# Patient Record
Sex: FEMALE | Race: WHITE | URBAN - METROPOLITAN AREA
[De-identification: names, ages, dates, MRNs, and addresses within clinical notes are randomized per-mention and may not be internally consistent; named-entity substitution may affect disease eponyms.]

---
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
## 2020-08-03 NOTE — ED ADULT NURSE NOTE - SEVERITY
History and Physical   Colon and Rectal Surgery   Ana Gross 66 y o  female MRN: [id number]  Unit/Bed#:  Encounter: [id number]  08/03/20   8:41 AM      CC:  History of perforated diverticulitis  History of Present Illness   HPI:  Ana Gross is a 66 y o  female with no GI symptoms      Historical Information   Past Medical History:   Diagnosis Date    Hyperlipidemia     Migraine      Past Surgical History:   Procedure Laterality Date    BREAST EXCISIONAL BIOPSY Right 1986    HARTMANS PROCEDURE N/A 3/8/2020    Procedure: Laparoscopic drainage of intra peritoneal abscess, sigmoid rescetion,;  Surgeon: Jay Castellanos MD;  Location: [facility];  Service: Colorectal    HYSTERECTOMY  1978    age 26   [facility] NASAL SEPTUM SURGERY      deviation repair    MT LAP,DIAGNOSTIC ABDOMEN N/A 3/8/2020    Procedure: LAPAROSCOPY DIAGNOSTIC;  Surgeon: Jay Castellanos MD;  [facility];  Service: Colorectal    MT SIGMOIDOSCOPY FLX DX W/COLLJ SPEC BR/WA IF PFRMD N/A 3/8/2020    Procedure: Jus Powellle;  Surgeon: Jay Castellanos MD;  [facility]: [facility];  Service: Colorectal    SHOULDER SURGERY         Meds/Allergies     (Not in a hospital admission)        Current Outpatient Medications:     aspirin (ECOTRIN) 325 mg EC tablet, Take 1 tablet (325 mg total) by mouth daily, Disp: 30 tablet, Rfl: 0    Cholecalciferol (VITAMIN D3 PO), Take by mouth, Disp: , Rfl:     citalopram (CeleXA) 20 mg tablet, Take 1 tablet (20 mg total) by mouth daily, Disp: 90 tablet, Rfl: 0    cyanocobalamin ([employer] VITAMIN B-12) 1000 MCG tablet, Take 1,000 mcg by mouth, Disp: , Rfl:     divalproex sodium (DEPAKOTE ER) 250 mg 24 hr tablet, Take 1 tablet (250 mg total) by mouth 2 (two) times a day, Disp: 180 tablet, Rfl: 5    Magnesium 500 MG CAPS, Take by mouth, Disp: , Rfl:     OLANZapine (ZyPREXA) 5 mg tablet, At bedtime only as needed, Disp: 10 tablet, Rfl: 3    pyridoxine (B-6) 100 MG tablet, Take 100 mg by mouth, Disp: , Rfl:     Riboflavin (B2) 100 MG TABS, Take by mouth, Disp: , Rfl:     simvastatin (ZOCOR) 40 mg tablet, Take 1 tablet (40 mg total) by mouth daily, Disp: 90 tablet, Rfl: 3    zolpidem (AMBIEN) 5 mg tablet, One pill at bedtime as needed to help with sleep, Disp: 30 tablet, Rfl: 3    albuterol (VENTOLIN HFA) 90 mcg/act inhaler, Inhale 2 puffs every 6 (six) hours as needed for wheezing, Disp: 1 Inhaler, Rfl: 0    naproxen (NAPROSYN) 500 mg tablet, Take 1 tablet (500 mg total) by mouth as needed for headaches, Disp: 10 tablet, Rfl: 0    SUMAtriptan (IMITREX) 100 mg tablet, Take 1 tablet at onset of migraine headache   May repeat in 2 hours if needed, no more than 2 in 24 hours and no more than 3 in a week, Disp: 12 tablet, Rfl: 0    Allergies   Allergen Reactions    Penicillins Anaphylaxis    Azithromycin Hives    Nisoldipine      Reaction Date: 14Apr2011;     Sulfa Antibiotics Hives         Social History   Social History     Substance and Sexual Activity   Alcohol Use Yes    Comment: social      Social History     Substance and Sexual Activity   Drug Use No     Social History     Tobacco Use   Smoking Status Current Every Day Smoker    Packs/day: 0 50    Years: 53 00    Pack years: 26 50    Start date: 1965   Smokeless Tobacco Never Used         Family History:   Family History   Problem Relation Age of Onset    Breast cancer Mother 48    Heart disease Father     Diabetes Sister     No Known Problems Maternal Grandmother     No Known Problems Maternal Grandfather     No Known Problems Paternal Grandmother     No Known Problems Paternal Grandfather     No Known Problems Sister     Breast cancer Maternal Aunt 48    No Known Problems Maternal Aunt     No Known Problems Paternal Aunt     Colon cancer Maternal Uncle     No Known Problems Son     No Known Problems Son     Lung cancer Other 52         Objective     Current Vitals:   Blood Pressure: 139/70 (08/03/20 0814)  Pulse: 74 (08/03/20 3394)  Temperature: 98 5 °F (36 9 °C) (08/03/20 0814)  Temp Source: Tympanic (08/03/20 0814)  Respirations: 18 (08/03/20 0814)  Height: 5' 6" (08/03/20 5883)  Weight - Scale: 67 1 kg (148 lb) (08/03/20 0814)  SpO2: 97 % (08/03/20 0814)  No intake or output data in the 24 hours ending 08/03/20 0841    Physical Exam:  General:  Well nourished, no distress  Neuro: Alert and oriented  Eyes:Sclera anicteric, conjunctiva pink  Pulm: Clear to auscultation bilaterally  No respiratory Distress  CV:  Regular rate and rhythm  No murmurs  Abdomen:  Soft, flat, non-tender, without masses or hepatosplenomegaly  Lab Results:       ASSESSMENT:  Jignesh Gamez is a 66 y o  female for screening  PLAN:  Colonoscopy  Risks , including, but not limited to, bleeding, perforation, missed lesions, and potential need for surgery, were reviewed  Alternatives to colonoscopy were discussed    Maddi Lai MD [facility]

## 2021-02-03 NOTE — ED PROVIDER NOTE - ABDOMINAL TENDER
63 right lower quadrant/periumbilical/left lower quadrant periumbilical/right lower quadrant/left lower quadrant/no rebound or guarding

## 2022-11-10 ENCOUNTER — NON-APPOINTMENT (OUTPATIENT)
Age: 22
End: 2022-11-10

## 2022-11-13 ENCOUNTER — NON-APPOINTMENT (OUTPATIENT)
Age: 22
End: 2022-11-13

## 2023-08-16 ENCOUNTER — NON-APPOINTMENT (OUTPATIENT)
Age: 23
End: 2023-08-16

## 2024-02-20 ENCOUNTER — NON-APPOINTMENT (OUTPATIENT)
Age: 24
End: 2024-02-20

## 2024-02-27 NOTE — ED PROVIDER NOTE - CONSTITUTIONAL NEGATIVE STATEMENT, MLM
[facility] Hospitalist Progress Note   Attending: Dr Darryl Childs  Primary:Tessa Gil MD   DOA: 02/26/2024    Date of service: 02/27/2024    [facility] [place] [facility] Hospitalist:  HPI:   Per H&P \"Fina Barry is a 38 year old female with PMH notable for Grave's disease and primary hyperparathyroidism s/p thyroidectomy and multigland parathyroidectomy, anxiety, depression, bipolar personality disorder who presents feeling generally unwell for about a week. She has had multiple symptoms that come and go. Initially had abdominal cramping and nausea that was relieved by eating. She is now having nausea that is worsened with eating and associated with some episodes of vomiting. Poor PO intake as a result. Most recently noting lightheadedness, feeling like she could pass out. She reported non-compliance with medications. Was not taking calcium initially and thought maybe her symptoms were due to hypocalcemic so then took more than prescribed to make up for missed doses. She reports taking Lithium as prescribed. Denies fevers, chills, dysuria, pelvic pain\".   ED course:   - Initially hypertensive, now within normal limits  - Cr 1.98, calcium elevated    - Lithium level elevated   - UA very mildly abnormal   - Started on IVF and admitted for further management     CC:   Nausea and vomiting with po intake, feeling that she may \"pass out\", poor po intake for about a week. Reports poor medication compliance.     Subjective:     Patient complains of \"severe HA\" that is identical to her typical migraine plus nausea, feeling that she \"might pass out\", fatigue, weakness and muscle aching.   She reports that these symptoms started around the same time that she stopped her Invega and took one dose of Varylar. She was unsure if she is going through withdrawal from the Invega or she wondered if she had a reaction to the one dose of the Varylar.       She is currently laying in bed with an ice pack over her eyes   Her behavior is not manic and she  is very hypoactive, she needs encouragement do participate in any activity currently     Past Medical History:   Diagnosis Date    ADHD      Anxiety      Atopic dermatitis 11/13/2013    Borderline personality disorder (CMD)      Depression      Dysmetabolic syndrome      Excessive thirst      RANDOLPH (generalized anxiety disorder)      Hx of migraine headaches      Hypothyroidism      Inflammatory polyarthropathy (CMD)      Keratoconjunctivitis sicca, in Sjogren's syndrome (CMD)      MCTD (mixed connective tissue disease) (CMD)      Pituitary cyst (CMD)      Staphylococcus aureus infection       2007     Systemic lupus (CMD)      Thyroid disease      Urticaria      Significant psychiatric medications:   Vraylar: 1.5 mg at bedtime ---> only took one dose, has not taken since (was on Invega this was stopped at her last appointment 2/13/24)   Seroquel 25 mg (3tabs) at bedtime   Lamictal 200 mg tablet 2 tablets at bedtime   Lithium CR (ESKALITH) 450 MG -1 tablet every morning   Lithium CR (LITHOBID) 300 Mg -4 tablets (1200 mg) at bedtime   Propranolol LA 60 Mg -one daily   Vyvanse 60 mg-one daily   Hydroxyzine 50 mg -one tablet 6 times daily as needed for anxiety   Gabapentin 100 mg- one capsule BID as needed for anxiety   Trazodone 50 mg -1-3 tablets as needed for insomnia   Melatonin 5 mg take at bedtime as needed     Other meds,   Zanaflex 4 mg, one tablet TID as needed   Sumatriptan (imitrex) PRN for migraines     ROS: dizziness, abdominal pain, feeling \"shaky and weak\" fatigue     Visit Vitals  /85 (BP Location: RUE - Right upper extremity, Patient Position: Sitting)   Pulse 75   Temp 98.1 °F (36.7 °C) (Oral)   Resp 16   Ht 5' 5.5\" (1.664 m)   Wt 116.6 kg (257 lb 1.6 oz)   LMP 01/01/2024 (Approximate)   SpO2 98%   BMI 42.13 kg/m²   Labs  WBC (K/mcL)   Date Value   02/26/2024 10.8     RBC (mil/mcL)   Date Value   02/26/2024 3.59 (L)     HCT (%)   Date Value   02/26/2024 33.3 (L)     HGB (g/dL)   Date Value    02/26/2024 10.4 (L)     PLT (K/mcL)   Date Value   02/26/2024 384      Sodium (mmol/L)   Date Value   02/26/2024 139     Potassium (mmol/L)   Date Value   02/26/2024 3.9     Chloride (mmol/L)   Date Value   02/26/2024 103     Glucose (mg/dL)   Date Value   02/26/2024 123 (H)     Calcium (mg/dL)   Date Value   02/26/2024 11.0 (H)     Carbon Dioxide (mmol/L)   Date Value   02/26/2024 31     BUN (mg/dL)   Date Value   02/26/2024 25 (H)     Creatinine (mg/dL)   Date Value   02/26/2024 2.10 (H)      PHYSICAL EXAM  General appearance: alert and in mild distress related to migraine, obese, hypoactive    Head: Normocephalic, without obvious abnormality, atraumatic  Eyes: conjunctivae/sclerae normal. No erythema, edema or exudate.  Throat:  Oral mucosa moist , tongue normal; teeth and gums normal  Neck: no adenopathy, no carotid bruit  Back: Back symmetric, no curvature. Range of motion normal.   Lungs: clear to auscultation bilaterally, equal expansion, no wheezing, rhonchi or crackles present   Heart: regular rate and rhythm, S1, S2 normal, no murmur, click, rub or gallop  Abdomen: Soft, non-tender; non-distended, bowel sounds normal in all 4 quadrants;+nausea with palpation   Extremities: extremities normal, atraumatic, no cyanosis or edema present  Pulses: 2+ and symmetric bilateral radial and bilateral DP  Skin: Skin color, texture, turgor normal. No rashes, lesions, or wounds  Neurologic: Alert and oriented x3, normal strength and tone. Normal symmetric reflexes  Psych: has multiple physical complaints, is listless, flat affect, does not make eye contact   Assessment/plan     #Acute kidney injury, poor po intake    #Hypercalcemia   #Primary hyperparathyroidism s/p multigland parathyroidectomy    IVF for acute kidney injury and hypercalcemia  Calcium level is elevated; she had been taking more supplements than prescribed after a time of non-compliance. Until Ca normalizes, hold calcium/calcitriol supplements  Tele  monitor for potential arrhythmias   Dehydration       #Lithium toxicity-resolved    #Anxiety, #depression, #borderline personality disorder   Hold lithium and monitor serial levels   Psychiatry consulted for guidance with med management   Continue lamictal, propranolol, seroquel, Vyvanse prn hydroxyzine and trazodone  Have pharmacy clarify dosing   Check liver enzymes -stable   Plan follow up with psychiatrist as outpatient   GOT/AST (Units/L)   Date Value   02/26/2024 9     GPT/ALT (Units/L)   Date Value   02/26/2024 15     No results found for: \"GGTP\"  Alkaline Phosphatase (Units/L)   Date Value   02/26/2024 93     Bilirubin, Total (mg/dL)   Date Value   02/26/2024 0.2        #Graves disease s/p thyroidectomy, now hypothyroid  #Mixed connective tissue disease,   #Sjogrens syndrome with sicca symptoms  TSH (mcUnits/mL)   Date Value   02/26/2024 97.730 (H)    Elevation most likely due to medication noncompliance   Charted \"allergy vs intolerance\" to synthroid, is \"headache\"     #Abnormal UA   Appears contaminate vs actual acute cystitis   Is currently in culture due to a few bacteria  She has no symptoms, no antibiotics at this time is indicated   Afebrile     #Chronic anemia   HGB (g/dL)   Date Value   02/26/2024 10.4 (L)    Will monitor   Is on hydroxychloroquine     #Morbid Obesity   Wt Readings from Last 3 Encounters:   12/08/23 114.8 kg (253 lb)   09/08/23 118.1 kg (260 lb 5 oz)   07/12/23 115.7 kg (255 lb)     Last Lab A1C:  Hemoglobin A1C (%)   Date Value   01/18/2023 4.9   Patient is on metformin 500 mg (2 tablets BID) this is not for diabetes this is for weight loss. Her obesity may be related to her psychotropic medications that are known for causing weight gain. She has a BMI of 42.13 KG  After discharge it would be recommended that she follow up with her pcp and discuss working with a bariatric program. (She may benefit from GLP-1 with history of medication noncompliance a surgical intervention may be  more appropriate)            Reviewed with attending Hospitalist EMMANUEL Calle  [facility] Hospitalist  Pager # [phone]     [facility] Hospital Medicine Attending Addendum:    I have reviewed the notes and assessments performed by EMMANUEL French and made adjustments as necessary and we discussed the case and jointly formed the treatment plan above. I personally interviewed and examined the patient and agree with the documentation of the patient's care as above.    Exam findings:  A&Ox3, mild distress.  Speech not pressured, no evidence for tangential thinking or kaykay.  Neuro non focal.    Labs and imaging reviewed.    Nephrology and psychiatry on consult  IVFs, supportive care  Serial labs    Dispo: home MAGED 2/28    Darryl Childs DO  [facility] Hospitalist    Contact the Hospitalist caring for the patient until 7:00pm. From 7:00pm to 7:00 am contact the Hospitalist on call     no fever and no chills.

## 2024-08-18 ENCOUNTER — NON-APPOINTMENT (OUTPATIENT)
Age: 24
End: 2024-08-18

## 2025-06-16 NOTE — ED PEDIATRIC NURSE NOTE - NS TRANSFER PATIENT BELONGINGS
Instructions: This plan will send the code FBSE to the PM system.  DO NOT or CHANGE the price. Detail Level: Simple Price (Do Not Change): 0.00 Clothing

## 2025-07-31 ENCOUNTER — NON-APPOINTMENT (OUTPATIENT)
Age: 25
End: 2025-07-31